# Patient Record
Sex: MALE | Race: BLACK OR AFRICAN AMERICAN | Employment: FULL TIME | URBAN - METROPOLITAN AREA
[De-identification: names, ages, dates, MRNs, and addresses within clinical notes are randomized per-mention and may not be internally consistent; named-entity substitution may affect disease eponyms.]

---

## 2018-03-15 ENCOUNTER — OFFICE VISIT (OUTPATIENT)
Dept: ORTHOPEDIC SURGERY | Age: 78
End: 2018-03-15

## 2018-03-15 VITALS — WEIGHT: 171.96 LBS | HEIGHT: 68 IN | BODY MASS INDEX: 26.06 KG/M2

## 2018-03-15 DIAGNOSIS — S61.011A LACERATION OF RIGHT THUMB WITHOUT FOREIGN BODY WITHOUT DAMAGE TO NAIL, INITIAL ENCOUNTER: ICD-10-CM

## 2018-03-15 DIAGNOSIS — S62.521A CLOSED DISPLACED FRACTURE OF DISTAL PHALANX OF RIGHT THUMB, INITIAL ENCOUNTER: ICD-10-CM

## 2018-03-15 PROCEDURE — 4004F PT TOBACCO SCREEN RCVD TLK: CPT | Performed by: ORTHOPAEDIC SURGERY

## 2018-03-15 PROCEDURE — 99203 OFFICE O/P NEW LOW 30 MIN: CPT | Performed by: ORTHOPAEDIC SURGERY

## 2018-03-15 PROCEDURE — G8419 CALC BMI OUT NRM PARAM NOF/U: HCPCS | Performed by: ORTHOPAEDIC SURGERY

## 2018-03-15 PROCEDURE — G8484 FLU IMMUNIZE NO ADMIN: HCPCS | Performed by: ORTHOPAEDIC SURGERY

## 2018-03-15 PROCEDURE — 1123F ACP DISCUSS/DSCN MKR DOCD: CPT | Performed by: ORTHOPAEDIC SURGERY

## 2018-03-15 PROCEDURE — G8427 DOCREV CUR MEDS BY ELIG CLIN: HCPCS | Performed by: ORTHOPAEDIC SURGERY

## 2018-03-15 PROCEDURE — 4040F PNEUMOC VAC/ADMIN/RCVD: CPT | Performed by: ORTHOPAEDIC SURGERY

## 2018-03-15 NOTE — PROGRESS NOTES
There are no additional worrisome rashes, ulcerations or lesions. Gait: normal    Circulation: well perfused        Additional Comments:     Additional Examinations:  Left Upper Extremity: Examination of the left upper extremity does not show any tenderness, deformity or injury. Range of motion is unremarkable. There is no gross instability. There are no rashes, ulcerations or lesions. Strength and tone are normal.      Radiology:     X-rays obtained from the emergency room and reviewed in office:  Views 3 views  Location right thumb  Impression displaced distal phalanx fracture which is extra-articular           Assessment:  Right thumb distal phalanx fracture and right thumb laceration    Impression:   Encounter Diagnoses   Name Primary?  Closed displaced fracture of distal phalanx of right thumb, initial encounter     Laceration of right thumb without foreign body without damage to nail, initial encounter        Office Procedures:  No orders of the defined types were placed in this encounter. Treatment Plan:  The laceration is clearly separate from the zone of the fracture and I believe that we can watch him closely with initial conservative care. Today we will allow him to start utilizing soap and water for wound care and simple bandaging and use of his him spica splint. We will see him back in 1 week as he will continue to take the antibiotics. On repeat exam we will take new x-rays and also make sure we can document good integrity of the EPL. I anticipate this can overall heal well with a good functional outcome as long as we do not see any signs of collapse or tendon dysfunction. Obviously I would support only one-handed duties until this is fully healed. Please note that this transcription was created using voice recognition software. Any errors are unintentional and may be due to voice recognition transcription.

## 2018-03-22 ENCOUNTER — OFFICE VISIT (OUTPATIENT)
Dept: ORTHOPEDIC SURGERY | Age: 78
End: 2018-03-22

## 2018-03-22 VITALS — HEIGHT: 68 IN | WEIGHT: 171.96 LBS | BODY MASS INDEX: 26.06 KG/M2

## 2018-03-22 DIAGNOSIS — S62.521D CLOSED DISPLACED FRACTURE OF DISTAL PHALANX OF RIGHT THUMB WITH ROUTINE HEALING, SUBSEQUENT ENCOUNTER: Primary | ICD-10-CM

## 2018-03-22 DIAGNOSIS — S61.011D LACERATION OF RIGHT THUMB WITHOUT FOREIGN BODY WITHOUT DAMAGE TO NAIL, SUBSEQUENT ENCOUNTER: ICD-10-CM

## 2018-03-22 PROCEDURE — 99213 OFFICE O/P EST LOW 20 MIN: CPT | Performed by: ORTHOPAEDIC SURGERY

## 2018-03-22 PROCEDURE — G8427 DOCREV CUR MEDS BY ELIG CLIN: HCPCS | Performed by: ORTHOPAEDIC SURGERY

## 2018-03-22 PROCEDURE — G8484 FLU IMMUNIZE NO ADMIN: HCPCS | Performed by: ORTHOPAEDIC SURGERY

## 2018-03-22 PROCEDURE — 4004F PT TOBACCO SCREEN RCVD TLK: CPT | Performed by: ORTHOPAEDIC SURGERY

## 2018-03-22 PROCEDURE — 1123F ACP DISCUSS/DSCN MKR DOCD: CPT | Performed by: ORTHOPAEDIC SURGERY

## 2018-03-22 PROCEDURE — 29130 APPL FINGER SPLINT STATIC: CPT | Performed by: ORTHOPAEDIC SURGERY

## 2018-03-22 PROCEDURE — G8419 CALC BMI OUT NRM PARAM NOF/U: HCPCS | Performed by: ORTHOPAEDIC SURGERY

## 2018-03-22 PROCEDURE — 4040F PNEUMOC VAC/ADMIN/RCVD: CPT | Performed by: ORTHOPAEDIC SURGERY

## 2018-03-22 NOTE — PROGRESS NOTES
Chief Complaint:  Hand Pain (ck right thumb DOI 3/14/2018)      History of Present of Illness: The patient returns for close follow-up of his right thumb injury. He reports no interval setback. History reviewed. No pertinent past medical history. Examination:  On exam today the wound is healing nicely and there is still moderate swelling at the thumb but the overall resting alignment is satisfactory. There is good perfusion and protective sensation. He demonstrates definitive firing of the FPL and EPL without need tendon dysfunction. Radiology:     X-rays obtained and reviewed in office:  Views 3 views  Location right thumb  Impression x-rays demonstrate a mildly displaced but extra-articular thumb distal phalanx fracture without interval change. PIP joint remains congruent. As noted before there is slight volar translation of the fracture fragment on the lateral view. Orders Placed This Encounter   Procedures    XR FINGER RIGHT (MIN 2 VIEWS)     46150     Order Specific Question:   Reason for exam:     Answer:   Pain    NH APPLY FINGER SPLINT,STATIC       Impression:  Encounter Diagnoses   Name Primary?  Closed displaced fracture of distal phalanx of right thumb with routine healing, subsequent encounter Yes    Laceration of right thumb without foreign body without damage to nail, subsequent encounter          Treatment Plan:  I believe we certainly can treat his fracture with conservative care. I presented the option of open reduction and pinning of this fracture but he is comfortable with continued conservative care as well. He may have some mild residual deformity but in the overall circumstances here I am comfortable with allowing this to heal.  I will see him back in 2 weeks. If at that juncture there is any concerning loss of position or alignment, surgical intervention could still be discussed. We will maintain the current lifting restrictions over the next 2 weeks.     I will

## 2018-04-05 ENCOUNTER — OFFICE VISIT (OUTPATIENT)
Dept: ORTHOPEDIC SURGERY | Age: 78
End: 2018-04-05

## 2018-04-05 VITALS — BODY MASS INDEX: 26.06 KG/M2 | HEIGHT: 68 IN | WEIGHT: 171.96 LBS

## 2018-04-05 DIAGNOSIS — S62.521D CLOSED DISPLACED FRACTURE OF DISTAL PHALANX OF RIGHT THUMB WITH ROUTINE HEALING, SUBSEQUENT ENCOUNTER: ICD-10-CM

## 2018-04-05 DIAGNOSIS — S61.011D LACERATION OF RIGHT THUMB WITHOUT FOREIGN BODY WITHOUT DAMAGE TO NAIL, SUBSEQUENT ENCOUNTER: ICD-10-CM

## 2018-04-05 DIAGNOSIS — M79.644 FINGER PAIN, RIGHT: Primary | ICD-10-CM

## 2018-04-05 PROCEDURE — 99213 OFFICE O/P EST LOW 20 MIN: CPT | Performed by: ORTHOPAEDIC SURGERY
